# Patient Record
Sex: FEMALE | Race: WHITE | NOT HISPANIC OR LATINO | Employment: FULL TIME | URBAN - METROPOLITAN AREA
[De-identification: names, ages, dates, MRNs, and addresses within clinical notes are randomized per-mention and may not be internally consistent; named-entity substitution may affect disease eponyms.]

---

## 2020-04-28 DIAGNOSIS — R10.13 EPIGASTRIC PAIN: ICD-10-CM

## 2020-04-28 DIAGNOSIS — K42.9 UMBILICAL HERNIA WITHOUT OBSTRUCTION OR GANGRENE: Primary | ICD-10-CM

## 2020-05-13 ENCOUNTER — HOSPITAL ENCOUNTER (OUTPATIENT)
Dept: RADIOLOGY | Facility: HOSPITAL | Age: 55
Discharge: HOME | End: 2020-05-13
Attending: FAMILY MEDICINE
Payer: MEDICAID

## 2020-05-13 DIAGNOSIS — K42.9 UMBILICAL HERNIA WITHOUT OBSTRUCTION OR GANGRENE: ICD-10-CM

## 2020-05-13 DIAGNOSIS — R10.13 EPIGASTRIC PAIN: ICD-10-CM

## 2020-05-13 PROCEDURE — 76705 ECHO EXAM OF ABDOMEN: CPT | Mod: TC,PO

## 2020-05-13 PROCEDURE — 74018 RADEX ABDOMEN 1 VIEW: CPT | Mod: TC,PO

## 2020-05-20 DIAGNOSIS — M62.08 SEPARATION OF MUSCLE (NONTRAUMATIC), OTHER SITE: Primary | ICD-10-CM

## 2020-05-21 DIAGNOSIS — K43.9 VENTRAL HERNIA WITHOUT OBSTRUCTION OR GANGRENE: Primary | ICD-10-CM

## 2020-05-21 DIAGNOSIS — M62.08 SEPARATION OF MUSCLE (NONTRAUMATIC), OTHER SITE: ICD-10-CM

## 2024-09-24 ENCOUNTER — HOSPITAL ENCOUNTER (INPATIENT)
Facility: HOSPITAL | Age: 59
LOS: 1 days | Discharge: HOME | DRG: 313 | End: 2024-09-25
Attending: EMERGENCY MEDICINE | Admitting: STUDENT IN AN ORGANIZED HEALTH CARE EDUCATION/TRAINING PROGRAM
Payer: MEDICAID

## 2024-09-24 DIAGNOSIS — R07.9 CHEST PAIN: Primary | ICD-10-CM

## 2024-09-24 DIAGNOSIS — R07.1 CHEST PAIN ON BREATHING: ICD-10-CM

## 2024-09-24 LAB
ALBUMIN SERPL BCP-MCNC: 3.6 G/DL (ref 3.5–5.2)
ALP SERPL-CCNC: 112 U/L (ref 55–135)
ALT SERPL W/O P-5'-P-CCNC: 9 U/L (ref 10–44)
ANION GAP SERPL CALC-SCNC: 11 MMOL/L (ref 8–16)
AST SERPL-CCNC: 11 U/L (ref 10–40)
BASOPHILS # BLD AUTO: 0.04 K/UL (ref 0–0.2)
BASOPHILS NFR BLD: 0.3 % (ref 0–1.9)
BILIRUB SERPL-MCNC: 0.3 MG/DL (ref 0.1–1)
BNP SERPL-MCNC: 64 PG/ML (ref 0–99)
BUN SERPL-MCNC: 11 MG/DL (ref 6–20)
CALCIUM SERPL-MCNC: 9.3 MG/DL (ref 8.7–10.5)
CHLORIDE SERPL-SCNC: 104 MMOL/L (ref 95–110)
CO2 SERPL-SCNC: 26 MMOL/L (ref 23–29)
CREAT SERPL-MCNC: 0.9 MG/DL (ref 0.5–1.4)
DIFFERENTIAL METHOD BLD: ABNORMAL
EOSINOPHIL # BLD AUTO: 0.3 K/UL (ref 0–0.5)
EOSINOPHIL NFR BLD: 2.1 % (ref 0–8)
ERYTHROCYTE [DISTWIDTH] IN BLOOD BY AUTOMATED COUNT: 13.7 % (ref 11.5–14.5)
EST. GFR  (NO RACE VARIABLE): >60 ML/MIN/1.73 M^2
GLUCOSE SERPL-MCNC: 130 MG/DL (ref 70–110)
HCT VFR BLD AUTO: 44.1 % (ref 37–48.5)
HGB BLD-MCNC: 14.2 G/DL (ref 12–16)
IMM GRANULOCYTES # BLD AUTO: 0.03 K/UL (ref 0–0.04)
IMM GRANULOCYTES NFR BLD AUTO: 0.2 % (ref 0–0.5)
LYMPHOCYTES # BLD AUTO: 3.9 K/UL (ref 1–4.8)
LYMPHOCYTES NFR BLD: 30.9 % (ref 18–48)
MCH RBC QN AUTO: 28.3 PG (ref 27–31)
MCHC RBC AUTO-ENTMCNC: 32.2 G/DL (ref 32–36)
MCV RBC AUTO: 88 FL (ref 82–98)
MONOCYTES # BLD AUTO: 0.6 K/UL (ref 0.3–1)
MONOCYTES NFR BLD: 4.8 % (ref 4–15)
NEUTROPHILS # BLD AUTO: 7.8 K/UL (ref 1.8–7.7)
NEUTROPHILS NFR BLD: 61.7 % (ref 38–73)
NRBC BLD-RTO: 0 /100 WBC
PLATELET # BLD AUTO: 312 K/UL (ref 150–450)
PMV BLD AUTO: 9.8 FL (ref 9.2–12.9)
POTASSIUM SERPL-SCNC: 4 MMOL/L (ref 3.5–5.1)
PROT SERPL-MCNC: 7.7 G/DL (ref 6–8.4)
RBC # BLD AUTO: 5.02 M/UL (ref 4–5.4)
SODIUM SERPL-SCNC: 141 MMOL/L (ref 136–145)
TROPONIN I SERPL DL<=0.01 NG/ML-MCNC: 0.01 NG/ML (ref 0–0.03)
TROPONIN I SERPL DL<=0.01 NG/ML-MCNC: 0.01 NG/ML (ref 0–0.03)
WBC # BLD AUTO: 12.61 K/UL (ref 3.9–12.7)

## 2024-09-24 PROCEDURE — 21400001 HC TELEMETRY ROOM

## 2024-09-24 PROCEDURE — 36415 COLL VENOUS BLD VENIPUNCTURE: CPT | Performed by: NURSE PRACTITIONER

## 2024-09-24 PROCEDURE — 94761 N-INVAS EAR/PLS OXIMETRY MLT: CPT

## 2024-09-24 PROCEDURE — 99285 EMERGENCY DEPT VISIT HI MDM: CPT | Mod: 25

## 2024-09-24 PROCEDURE — 94760 N-INVAS EAR/PLS OXIMETRY 1: CPT

## 2024-09-24 PROCEDURE — 93005 ELECTROCARDIOGRAM TRACING: CPT

## 2024-09-24 PROCEDURE — 99900031 HC PATIENT EDUCATION (STAT)

## 2024-09-24 PROCEDURE — 83880 ASSAY OF NATRIURETIC PEPTIDE: CPT | Performed by: NURSE PRACTITIONER

## 2024-09-24 PROCEDURE — 80053 COMPREHEN METABOLIC PANEL: CPT | Performed by: NURSE PRACTITIONER

## 2024-09-24 PROCEDURE — 85025 COMPLETE CBC W/AUTO DIFF WBC: CPT | Performed by: NURSE PRACTITIONER

## 2024-09-24 PROCEDURE — 93010 ELECTROCARDIOGRAM REPORT: CPT | Mod: ,,, | Performed by: GENERAL PRACTICE

## 2024-09-24 PROCEDURE — 84484 ASSAY OF TROPONIN QUANT: CPT | Performed by: NURSE PRACTITIONER

## 2024-09-24 NOTE — FIRST PROVIDER EVALUATION
Emergency Department TeleTriage Encounter Note      CHIEF COMPLAINT    Chief Complaint   Patient presents with    Chest Pain     Heart attack 9/22       VITAL SIGNS   Initial Vitals [09/24/24 1103]   BP Pulse Resp Temp SpO2   138/80 105 18 97.3 °F (36.3 °C) 96 %      MAP       --            ALLERGIES    Review of patient's allergies indicates:  No Known Allergies    PROVIDER TRIAGE NOTE  TeleTriage Note: Ceci Fitzpatrick, a nontoxic/well appearing, 59 y.o. female, presented to the ED with c/o chest pain that began on 9/15. Seen at [facility] on 9/16 for NSTEMI but signed out AMA. Now coming back because she is still having intermittent chest pains.     All ED beds are full at present; patient notified of this status.  Patient seen and medically screened by Nurse Practitioner via teletriage. Orders initiated at triage to expedite care.  Patient is stable to return to the waiting room and will be placed in an ED bed when available.  Care will be transferred to an alternate provider when patient has been placed in an Exam Room from the [facility] for physical exam, additional orders, and disposition.  11:06 AM Latonia Mendoza, DNP, FNP-C        ORDERS  Labs Reviewed   CBC W/ AUTO DIFFERENTIAL   COMPREHENSIVE METABOLIC PANEL   TROPONIN I   TROPONIN I   B-TYPE NATRIURETIC PEPTIDE       ED Orders (720h ago, onward)      Start Ordered     Status Ordering Provider    09/24/24 1410 09/24/24 1109  Troponin I #2  Once Timed         Pending Collection LATONIA MENDOZA    09/24/24 1110 09/24/24 1109  Vital signs  Every 15 min         Ordered LATONIA MENDOZA    09/24/24 1110 09/24/24 1109  Cardiac Monitoring - Adult  Continuous        Comments: Notify Physician If:    Ordered LATONIA MENDOZA    09/24/24 1110 09/24/24 1109  Pulse Oximetry Continuous  Continuous         Ordered LATONIA MENDOZA    09/24/24 1110 09/24/24 1109  Diet NPO  Diet effective now         Ordered LATONIA MENDOZA    09/24/24 1110 09/24/24 1109  Saline lock IV   Once         Ordered REJITAMIA QUIGLEYHany    09/24/24 1110 09/24/24 1109  EKG 12-lead  Once        Comments: Do not perform if previously done during this visit/ triage    Ordered REJI, TAMIA BEDOLLAHany    09/24/24 1110 09/24/24 1109  CBC auto differential  STAT         Ordered REJI, TAIMA BEDOLLAHany    09/24/24 1110 09/24/24 1109  Comprehensive metabolic panel  STAT         Ordered REJI, TAMIA BEDOLLAHany    09/24/24 1110 09/24/24 1109  Troponin I #1  STAT         Ordered REJI, TAMIA BEDOLLAHany    09/24/24 1110 09/24/24 1109  B-Type natriuretic peptide (BNP)  STAT         Ordered REJI, TAMIA BEDOLLAHany              Virtual Visit Note: The provider triage portion of this emergency department evaluation and documentation was performed via [employer], a HIPAA-compliant telemedicine application, in concert with a tele-presenter in the room. A face to face patient evaluation with one of my colleagues will occur once the patient is placed in an emergency department room.      DISCLAIMER: This note was prepared with [employer] voice recognition transcription software. Garbled syntax, mangled pronouns, and other bizarre constructions may be attributed to that software system.

## 2024-09-24 NOTE — ED PROVIDER NOTES
Encounter Date: 9/24/2024       History     Chief Complaint   Patient presents with    Chest Pain     Heart attack 9/22     HPI patient is a 59-year-old woman who presents emergency department evaluation of chest discomfort.  Patient reports that she was admitted at [facility] last week after an episode of chest pain that was pressure-like with diaphoresis and associated nausea and vomiting lasting approximately 5-7 minutes and diagnosed with an NSTEMI and treated with heparin drip.  She left against medical advice prior to cardiac catheterization the next morning.  She lives up here and wants to set up cardiology evaluation here in [place].  Review of patient's allergies indicates:  No Known Allergies  No past medical history on file.  No past surgical history on file.  No family history on file.  Social History     Tobacco Use    Smoking status: Every Day     Current packs/day: 1.00     Types: Cigarettes   Substance Use Topics    Alcohol use: Yes     Comment: occas     Review of Systems   Constitutional:  Negative for fever.   HENT:  Negative for sore throat.    Respiratory:  Negative for shortness of breath.    Cardiovascular:  Positive for chest pain.   Gastrointestinal:  Negative for nausea.   Genitourinary:  Negative for dysuria.   Musculoskeletal:  Negative for back pain.   Skin:  Negative for rash.   Neurological:  Negative for weakness.   Hematological:  Does not bruise/bleed easily.   Psychiatric/Behavioral:  The patient is nervous/anxious.        Physical Exam     Initial Vitals [09/24/24 1103]   BP Pulse Resp Temp SpO2   138/80 105 18 97.3 °F (36.3 °C) 96 %      MAP       --         Physical Exam    Constitutional: Vital signs are normal. She appears well-developed and well-nourished.  Non-toxic appearance. No distress.   HENT:   Head: Normocephalic and atraumatic.   Eyes: EOM are normal. Pupils are equal, round, and reactive to light.   Neck: Neck supple. No JVD present.   Normal  range of motion.  Cardiovascular:  Normal rate, regular rhythm, normal heart sounds and intact distal pulses.     Exam reveals no gallop and no friction rub.       No murmur heard.  Pulmonary/Chest: Breath sounds normal. She has no wheezes. She has no rhonchi. She has no rales.   Abdominal: Abdomen is soft. Bowel sounds are normal. There is no abdominal tenderness. There is no rebound and no guarding.   Musculoskeletal:         General: Normal range of motion.      Cervical back: Normal range of motion and neck supple. No rigidity.     Neurological: She is alert and oriented to person, place, and time. She has normal strength and normal reflexes. No cranial nerve deficit or sensory deficit. She exhibits normal muscle tone. Coordination normal. GCS eye subscore is 4. GCS verbal subscore is 5. GCS motor subscore is 6.   Skin: Skin is warm and dry.   Psychiatric: She has a normal mood and affect. Her speech is normal and behavior is normal. She is not actively hallucinating.         ED Course   Procedures  Labs Reviewed   CBC W/ AUTO DIFFERENTIAL - Abnormal       Result Value    WBC 12.61      RBC 5.02      Hemoglobin 14.2      Hematocrit 44.1      MCV 88      MCH 28.3      MCHC 32.2      RDW 13.7      Platelets 312      MPV 9.8      Immature Granulocytes 0.2      Gran # (ANC) 7.8 (*)     Immature Grans (Abs) 0.03      Lymph # 3.9      Mono # 0.6      Eos # 0.3      Baso # 0.04      nRBC 0      Gran % 61.7      Lymph % 30.9      Mono % 4.8      Eosinophil % 2.1      Basophil % 0.3      Differential Method Automated     COMPREHENSIVE METABOLIC PANEL - Abnormal    Sodium 141      Potassium 4.0      Chloride 104      CO2 26      Glucose 130 (*)     BUN 11      Creatinine 0.9      Calcium 9.3      Total Protein 7.7      Albumin 3.6      Total Bilirubin 0.3      Alkaline Phosphatase 112      AST 11      ALT 9 (*)     eGFR >60      Anion Gap 11     TROPONIN I    Troponin I 0.010     TROPONIN I    Troponin I 0.010     B-TYPE  NATRIURETIC PEPTIDE    BNP 64            Imaging Results              X-Ray Chest AP Portable (Final result)  Result time 09/24/24 17:38:49      Final result by Felix Hernandez Jr., MD (09/24/24 17:38:49)                   Impression:      No acute abnormality.      Electronically signed by: Felix Hernandez MD  Date:    09/24/2024  Time:    17:38               Narrative:    EXAMINATION:  XR CHEST AP PORTABLE    CLINICAL HISTORY:  Chest pain, unspecified    TECHNIQUE:  Single frontal view of the chest was performed.    COMPARISON:  None    FINDINGS:  The lungs are clear, with normal appearance of pulmonary vasculature and no pleural effusion or pneumothorax.    The cardiac silhouette is normal in size. The hilar and mediastinal contours are unremarkable.  Bilateral pericardial fat pads are noted.    Bones are intact.                                       Medications - No data to display  Medical Decision Making  59-year-old woman who presents emergency department evaluation of chest discomfort.  Patient reports that she was admitted at [facility] last week after an episode of chest pain that was pressure-like with diaphoresis and associated nausea and vomiting lasting approximately 5-7 minutes and diagnosed with an NSTEMI and treated with heparin drip.  She left against medical advice prior to cardiac catheterization the next morning.  She lives up here and wants to set up cardiology evaluation here in [place].  EKG shows prior infarct, no acute ischemic changes.  BNP and troponin are normal here in the emergency department.  He is not tachycardic or hypoxic and no pleuritic pain, I doubt pulmonary embolism.  She has high-risk for recurrent ACS and has not been on antiplatelet therapy.  Discussed with cardiology who recommends sending to [facility] for evaluation for angiogram.    Amount and/or Complexity of Data Reviewed  Radiology: ordered.    Risk  Decision regarding  hospitalization.               ED Course as of 09/24/24 2137   Tue Sep 24, 2024   1724 Discussed with Dr. Espinosa who recommends transferring patient to [facility] for admission and cardiology evaluation for cardiac angiogram [AS]      ED Course User Index  [AS] Stu Piedra MD                           Clinical Impression:  Final diagnoses:  [R07.9] Chest pain          ED Disposition Condition    Admit                 Stu Piedra MD  09/24/24 2137

## 2024-09-25 ENCOUNTER — CLINICAL SUPPORT (OUTPATIENT)
Dept: CARDIOLOGY | Facility: HOSPITAL | Age: 59
End: 2024-09-25
Attending: EMERGENCY MEDICINE
Payer: MEDICAID

## 2024-09-25 ENCOUNTER — TELEPHONE (OUTPATIENT)
Dept: CARDIOLOGY | Facility: CLINIC | Age: 59
End: 2024-09-25

## 2024-09-25 ENCOUNTER — CLINICAL SUPPORT (OUTPATIENT)
Dept: CARDIOLOGY | Facility: HOSPITAL | Age: 59
End: 2024-09-25
Attending: STUDENT IN AN ORGANIZED HEALTH CARE EDUCATION/TRAINING PROGRAM
Payer: MEDICAID

## 2024-09-25 VITALS — BODY MASS INDEX: 31.7 KG/M2 | HEIGHT: 67 IN | WEIGHT: 201.94 LBS

## 2024-09-25 VITALS
BODY MASS INDEX: 31.71 KG/M2 | HEIGHT: 67 IN | SYSTOLIC BLOOD PRESSURE: 151 MMHG | DIASTOLIC BLOOD PRESSURE: 81 MMHG | OXYGEN SATURATION: 96 % | TEMPERATURE: 98 F | HEART RATE: 61 BPM | RESPIRATION RATE: 16 BRPM | WEIGHT: 202 LBS

## 2024-09-25 PROBLEM — R10.9 LEFT FLANK PAIN: Status: ACTIVE | Noted: 2024-09-25

## 2024-09-25 PROBLEM — R07.9 CHEST PAIN: Status: ACTIVE | Noted: 2024-09-25

## 2024-09-25 PROBLEM — F17.200 CURRENT EVERY DAY SMOKER: Status: ACTIVE | Noted: 2024-09-25

## 2024-09-25 LAB
ALBUMIN SERPL BCP-MCNC: 3.6 G/DL (ref 3.5–5.2)
ALP SERPL-CCNC: 86 U/L (ref 55–135)
ALT SERPL W/O P-5'-P-CCNC: 6 U/L (ref 10–44)
ANION GAP SERPL CALC-SCNC: 6 MMOL/L (ref 8–16)
AORTIC ROOT ANNULUS: 2.3 CM
AORTIC VALVE CUSP SEPERATION: 1.2 CM
APICAL FOUR CHAMBER EJECTION FRACTION: 52 %
AST SERPL-CCNC: 9 U/L (ref 10–40)
AV INDEX (PROSTH): 0.71
AV MEAN GRADIENT: 3 MMHG
AV PEAK GRADIENT: 6 MMHG
AV VALVE AREA BY VELOCITY RATIO: 2.25 CM²
AV VALVE AREA: 2.02 CM²
AV VELOCITY RATIO: 0.79
BASOPHILS # BLD AUTO: 0.04 K/UL (ref 0–0.2)
BASOPHILS NFR BLD: 0.3 % (ref 0–1.9)
BILIRUB SERPL-MCNC: 0.4 MG/DL (ref 0.1–1)
BILIRUB UR QL STRIP: NEGATIVE
BSA FOR ECHO PROCEDURE: 2.08 M2
BUN SERPL-MCNC: 9 MG/DL (ref 6–20)
CALCIUM SERPL-MCNC: 8.7 MG/DL (ref 8.7–10.5)
CHLORIDE SERPL-SCNC: 107 MMOL/L (ref 95–110)
CHOLEST SERPL-MCNC: 150 MG/DL (ref 120–199)
CHOLEST/HDLC SERPL: 3.8 {RATIO} (ref 2–5)
CLARITY UR: ABNORMAL
CO2 SERPL-SCNC: 27 MMOL/L (ref 23–29)
COLOR UR: YELLOW
CREAT SERPL-MCNC: 0.7 MG/DL (ref 0.5–1.4)
CV ECHO LV RWT: 0.39 CM
CV PHARM DOSE: 0.4 MG
CV STRESS BASE HR: 75 BPM
DIASTOLIC BLOOD PRESSURE: 65 MMHG
DIFFERENTIAL METHOD BLD: ABNORMAL
DOP CALC AO PEAK VEL: 1.26 M/S
DOP CALC AO VTI: 23.9 CM
DOP CALC LVOT AREA: 2.8 CM2
DOP CALC LVOT DIAMETER: 1.9 CM
DOP CALC LVOT PEAK VEL: 1 M/S
DOP CALC LVOT STROKE VOLUME: 48.18 CM3
DOP CALC MV VTI: 25.7 CM
DOP CALCLVOT PEAK VEL VTI: 17 CM
E WAVE DECELERATION TIME: 157 MSEC
E/A RATIO: 0.76
E/E' RATIO: 7.48 M/S
ECHO LV POSTERIOR WALL: 0.9 CM (ref 0.6–1.1)
EOSINOPHIL # BLD AUTO: 0.3 K/UL (ref 0–0.5)
EOSINOPHIL NFR BLD: 2.7 % (ref 0–8)
ERYTHROCYTE [DISTWIDTH] IN BLOOD BY AUTOMATED COUNT: 13.8 % (ref 11.5–14.5)
EST. GFR  (NO RACE VARIABLE): >60 ML/MIN/1.73 M^2
ESTIMATED AVG GLUCOSE: 128 MG/DL (ref 68–131)
FRACTIONAL SHORTENING: 35 % (ref 28–44)
GLUCOSE SERPL-MCNC: 126 MG/DL (ref 70–110)
GLUCOSE UR QL STRIP: NEGATIVE
HBA1C MFR BLD: 6.1 % (ref 4.5–6.2)
HCT VFR BLD AUTO: 40.3 % (ref 37–48.5)
HDLC SERPL-MCNC: 39 MG/DL (ref 40–75)
HDLC SERPL: 26 % (ref 20–50)
HGB BLD-MCNC: 13.1 G/DL (ref 12–16)
HGB UR QL STRIP: NEGATIVE
IMM GRANULOCYTES # BLD AUTO: 0.04 K/UL (ref 0–0.04)
IMM GRANULOCYTES NFR BLD AUTO: 0.3 % (ref 0–0.5)
INTERVENTRICULAR SEPTUM: 0.9 CM (ref 0.6–1.1)
IVC DIAMETER: 1.2 CM
KETONES UR QL STRIP: NEGATIVE
LDLC SERPL CALC-MCNC: 86.2 MG/DL (ref 63–159)
LEFT ATRIUM AREA SYSTOLIC (APICAL 2 CHAMBER): 17.3 CM2
LEFT ATRIUM AREA SYSTOLIC (APICAL 4 CHAMBER): 11.7 CM2
LEFT ATRIUM VOLUME INDEX MOD: 17.5 ML/M2
LEFT ATRIUM VOLUME MOD: 35.5 ML
LEFT INTERNAL DIMENSION IN SYSTOLE: 3 CM (ref 2.1–4)
LEFT VENTRICLE DIASTOLIC VOLUME INDEX: 47.95 ML/M2
LEFT VENTRICLE DIASTOLIC VOLUME: 97.34 ML
LEFT VENTRICLE END DIASTOLIC VOLUME APICAL 4 CHAMBER: 72 ML
LEFT VENTRICLE END SYSTOLIC VOLUME APICAL 2 CHAMBER: 47.6 ML
LEFT VENTRICLE END SYSTOLIC VOLUME APICAL 4 CHAMBER: 24.3 ML
LEFT VENTRICLE MASS INDEX: 68 G/M2
LEFT VENTRICLE SYSTOLIC VOLUME INDEX: 17.2 ML/M2
LEFT VENTRICLE SYSTOLIC VOLUME: 35 ML
LEFT VENTRICULAR INTERNAL DIMENSION IN DIASTOLE: 4.6 CM (ref 3.5–6)
LEFT VENTRICULAR MASS: 137.72 G
LEUKOCYTE ESTERASE UR QL STRIP: NEGATIVE
LV LATERAL E/E' RATIO: 6.14 M/S
LV SEPTAL E/E' RATIO: 9.56 M/S
LVED V (TEICH): 97.34 ML
LVES V (TEICH): 35 ML
LVOT MG: 2 MMHG
LVOT MV: 0.64 CM/S
LYMPHOCYTES # BLD AUTO: 4.4 K/UL (ref 1–4.8)
LYMPHOCYTES NFR BLD: 34 % (ref 18–48)
MAGNESIUM SERPL-MCNC: 1.8 MG/DL (ref 1.6–2.6)
MCH RBC QN AUTO: 28.1 PG (ref 27–31)
MCHC RBC AUTO-ENTMCNC: 32.5 G/DL (ref 32–36)
MCV RBC AUTO: 87 FL (ref 82–98)
MONOCYTES # BLD AUTO: 0.8 K/UL (ref 0.3–1)
MONOCYTES NFR BLD: 6 % (ref 4–15)
MV MEAN GRADIENT: 3 MMHG
MV PEAK A VEL: 1.13 M/S
MV PEAK E VEL: 0.86 M/S
MV PEAK GRADIENT: 5 MMHG
MV STENOSIS PRESSURE HALF TIME: 66 MS
MV VALVE AREA BY CONTINUITY EQUATION: 1.87 CM2
MV VALVE AREA P 1/2 METHOD: 3.33 CM2
NEUTROPHILS # BLD AUTO: 7.3 K/UL (ref 1.8–7.7)
NEUTROPHILS NFR BLD: 56.7 % (ref 38–73)
NITRITE UR QL STRIP: NEGATIVE
NONHDLC SERPL-MCNC: 111 MG/DL
NRBC BLD-RTO: 0 /100 WBC
OHS CV CPX 1 MINUTE RECOVERY HEART RATE: 114 BPM
OHS CV CPX 85 PERCENT MAX PREDICTED HEART RATE MALE: 139
OHS CV CPX MAX PREDICTED HEART RATE: 163
OHS CV CPX PATIENT IS FEMALE: 1
OHS CV CPX PATIENT IS MALE: 0
OHS CV CPX PEAK DIASTOLIC BLOOD PRESSURE: 53 MMHG
OHS CV CPX PEAK HEAR RATE: 114 BPM
OHS CV CPX PEAK RATE PRESSURE PRODUCT: NORMAL
OHS CV CPX PEAK SYSTOLIC BLOOD PRESSURE: 120 MMHG
OHS CV CPX PERCENT MAX PREDICTED HEART RATE ACHIEVED: 73
OHS CV CPX RATE PRESSURE PRODUCT PRESENTING: 8775
PH UR STRIP: 6 [PH] (ref 5–8)
PLATELET # BLD AUTO: 266 K/UL (ref 150–450)
PMV BLD AUTO: 9.8 FL (ref 9.2–12.9)
POTASSIUM SERPL-SCNC: 3.9 MMOL/L (ref 3.5–5.1)
PROT SERPL-MCNC: 6.5 G/DL (ref 6–8.4)
PROT UR QL STRIP: ABNORMAL
PV MV: 0.66 M/S
PV PEAK GRADIENT: 4 MMHG
PV PEAK VELOCITY: 0.98 M/S
RA PRESSURE ESTIMATED: 3 MMHG
RBC # BLD AUTO: 4.66 M/UL (ref 4–5.4)
RIGHT ATRIUM VOLUME AREA LENGTH APICAL 4 CHAMBER: 21.9 ML
RV TISSUE DOPPLER FREE WALL SYSTOLIC VELOCITY 1 (APICAL 4 CHAMBER VIEW): 10.7 CM/S
SODIUM SERPL-SCNC: 140 MMOL/L (ref 136–145)
SP GR UR STRIP: 1.03 (ref 1–1.03)
SYSTOLIC BLOOD PRESSURE: 117 MMHG
T4 FREE SERPL-MCNC: 0.8 NG/DL (ref 0.71–1.51)
TDI LATERAL: 0.14 M/S
TDI SEPTAL: 0.09 M/S
TDI: 0.12 M/S
TRICUSPID ANNULAR PLANE SYSTOLIC EXCURSION: 2.04 CM
TRIGL SERPL-MCNC: 124 MG/DL (ref 30–150)
TROPONIN I SERPL HS-MCNC: 5.5 PG/ML (ref 0–14.9)
TROPONIN I SERPL HS-MCNC: 6.7 PG/ML (ref 0–14.9)
TSH SERPL DL<=0.005 MIU/L-ACNC: 2.25 UIU/ML (ref 0.34–5.6)
URN SPEC COLLECT METH UR: ABNORMAL
UROBILINOGEN UR STRIP-ACNC: NEGATIVE EU/DL
WBC # BLD AUTO: 12.81 K/UL (ref 3.9–12.7)
Z-SCORE OF LEFT VENTRICULAR DIMENSION IN END DIASTOLE: -2.68
Z-SCORE OF LEFT VENTRICULAR DIMENSION IN END SYSTOLE: -1.63

## 2024-09-25 PROCEDURE — 99499 UNLISTED E&M SERVICE: CPT | Mod: 25,,, | Performed by: INTERNAL MEDICINE

## 2024-09-25 PROCEDURE — 94799 UNLISTED PULMONARY SVC/PX: CPT

## 2024-09-25 PROCEDURE — 93017 CV STRESS TEST TRACING ONLY: CPT

## 2024-09-25 PROCEDURE — 83735 ASSAY OF MAGNESIUM: CPT | Performed by: STUDENT IN AN ORGANIZED HEALTH CARE EDUCATION/TRAINING PROGRAM

## 2024-09-25 PROCEDURE — 80053 COMPREHEN METABOLIC PANEL: CPT | Performed by: STUDENT IN AN ORGANIZED HEALTH CARE EDUCATION/TRAINING PROGRAM

## 2024-09-25 PROCEDURE — 85025 COMPLETE CBC W/AUTO DIFF WBC: CPT | Performed by: STUDENT IN AN ORGANIZED HEALTH CARE EDUCATION/TRAINING PROGRAM

## 2024-09-25 PROCEDURE — 36415 COLL VENOUS BLD VENIPUNCTURE: CPT | Performed by: STUDENT IN AN ORGANIZED HEALTH CARE EDUCATION/TRAINING PROGRAM

## 2024-09-25 PROCEDURE — 93306 TTE W/DOPPLER COMPLETE: CPT

## 2024-09-25 PROCEDURE — 25000003 PHARM REV CODE 250: Performed by: STUDENT IN AN ORGANIZED HEALTH CARE EDUCATION/TRAINING PROGRAM

## 2024-09-25 PROCEDURE — 84439 ASSAY OF FREE THYROXINE: CPT | Performed by: STUDENT IN AN ORGANIZED HEALTH CARE EDUCATION/TRAINING PROGRAM

## 2024-09-25 PROCEDURE — 63600175 PHARM REV CODE 636 W HCPCS: Performed by: STUDENT IN AN ORGANIZED HEALTH CARE EDUCATION/TRAINING PROGRAM

## 2024-09-25 PROCEDURE — 84484 ASSAY OF TROPONIN QUANT: CPT | Mod: 91 | Performed by: STUDENT IN AN ORGANIZED HEALTH CARE EDUCATION/TRAINING PROGRAM

## 2024-09-25 PROCEDURE — 21400001 HC TELEMETRY ROOM

## 2024-09-25 PROCEDURE — 83036 HEMOGLOBIN GLYCOSYLATED A1C: CPT | Performed by: STUDENT IN AN ORGANIZED HEALTH CARE EDUCATION/TRAINING PROGRAM

## 2024-09-25 PROCEDURE — 93016 CV STRESS TEST SUPVJ ONLY: CPT | Mod: ,,, | Performed by: INTERNAL MEDICINE

## 2024-09-25 PROCEDURE — 84443 ASSAY THYROID STIM HORMONE: CPT | Performed by: STUDENT IN AN ORGANIZED HEALTH CARE EDUCATION/TRAINING PROGRAM

## 2024-09-25 PROCEDURE — 80061 LIPID PANEL: CPT | Performed by: STUDENT IN AN ORGANIZED HEALTH CARE EDUCATION/TRAINING PROGRAM

## 2024-09-25 PROCEDURE — 93018 CV STRESS TEST I&R ONLY: CPT | Mod: ,,, | Performed by: INTERNAL MEDICINE

## 2024-09-25 PROCEDURE — 94761 N-INVAS EAR/PLS OXIMETRY MLT: CPT

## 2024-09-25 PROCEDURE — 99900035 HC TECH TIME PER 15 MIN (STAT)

## 2024-09-25 PROCEDURE — 93306 TTE W/DOPPLER COMPLETE: CPT | Mod: 26,,, | Performed by: INTERNAL MEDICINE

## 2024-09-25 PROCEDURE — 81003 URINALYSIS AUTO W/O SCOPE: CPT | Performed by: NURSE PRACTITIONER

## 2024-09-25 RX ORDER — SODIUM,POTASSIUM PHOSPHATES 280-250MG
2 POWDER IN PACKET (EA) ORAL
Status: DISCONTINUED | OUTPATIENT
Start: 2024-09-25 | End: 2024-09-26 | Stop reason: HOSPADM

## 2024-09-25 RX ORDER — POLYETHYLENE GLYCOL 3350 17 G/17G
17 POWDER, FOR SOLUTION ORAL DAILY PRN
Status: DISCONTINUED | OUTPATIENT
Start: 2024-09-25 | End: 2024-09-26 | Stop reason: HOSPADM

## 2024-09-25 RX ORDER — ASPIRIN 81 MG/1
81 TABLET ORAL DAILY
Qty: 30 TABLET | Refills: 0 | Status: SHIPPED | OUTPATIENT
Start: 2024-09-26 | End: 2024-10-26

## 2024-09-25 RX ORDER — GUAIFENESIN 100 MG/5ML
200 SOLUTION ORAL EVERY 4 HOURS PRN
Status: DISCONTINUED | OUTPATIENT
Start: 2024-09-25 | End: 2024-09-26 | Stop reason: HOSPADM

## 2024-09-25 RX ORDER — NAPROXEN SODIUM 220 MG/1
81 TABLET, FILM COATED ORAL DAILY
Status: DISCONTINUED | OUTPATIENT
Start: 2024-09-25 | End: 2024-09-25

## 2024-09-25 RX ORDER — ONDANSETRON HYDROCHLORIDE 2 MG/ML
4 INJECTION, SOLUTION INTRAVENOUS EVERY 6 HOURS PRN
Status: DISCONTINUED | OUTPATIENT
Start: 2024-09-25 | End: 2024-09-26 | Stop reason: HOSPADM

## 2024-09-25 RX ORDER — NITROGLYCERIN 0.4 MG/1
0.4 TABLET SUBLINGUAL EVERY 5 MIN PRN
Status: DISCONTINUED | OUTPATIENT
Start: 2024-09-25 | End: 2024-09-26 | Stop reason: HOSPADM

## 2024-09-25 RX ORDER — IBUPROFEN 200 MG
16 TABLET ORAL
Status: DISCONTINUED | OUTPATIENT
Start: 2024-09-25 | End: 2024-09-26 | Stop reason: HOSPADM

## 2024-09-25 RX ORDER — SODIUM CHLORIDE 0.9 % (FLUSH) 0.9 %
10 SYRINGE (ML) INJECTION
Status: DISCONTINUED | OUTPATIENT
Start: 2024-09-25 | End: 2024-09-26 | Stop reason: HOSPADM

## 2024-09-25 RX ORDER — LANOLIN ALCOHOL/MO/W.PET/CERES
800 CREAM (GRAM) TOPICAL
Status: DISCONTINUED | OUTPATIENT
Start: 2024-09-25 | End: 2024-09-26 | Stop reason: HOSPADM

## 2024-09-25 RX ORDER — CLOPIDOGREL BISULFATE 75 MG/1
75 TABLET ORAL DAILY
Status: DISCONTINUED | OUTPATIENT
Start: 2024-09-26 | End: 2024-09-26 | Stop reason: HOSPADM

## 2024-09-25 RX ORDER — METOPROLOL TARTRATE 25 MG/1
12.5 TABLET, FILM COATED ORAL 2 TIMES DAILY
Qty: 30 TABLET | Refills: 0 | Status: SHIPPED | OUTPATIENT
Start: 2024-09-25 | End: 2024-10-25

## 2024-09-25 RX ORDER — ALUMINUM HYDROXIDE, MAGNESIUM HYDROXIDE, AND SIMETHICONE 1200; 120; 1200 MG/30ML; MG/30ML; MG/30ML
30 SUSPENSION ORAL 4 TIMES DAILY PRN
Status: DISCONTINUED | OUTPATIENT
Start: 2024-09-25 | End: 2024-09-26 | Stop reason: HOSPADM

## 2024-09-25 RX ORDER — ACETAMINOPHEN 325 MG/1
650 TABLET ORAL EVERY 4 HOURS PRN
Status: DISCONTINUED | OUTPATIENT
Start: 2024-09-25 | End: 2024-09-26 | Stop reason: HOSPADM

## 2024-09-25 RX ORDER — ENOXAPARIN SODIUM 100 MG/ML
40 INJECTION SUBCUTANEOUS EVERY 24 HOURS
Status: DISCONTINUED | OUTPATIENT
Start: 2024-09-25 | End: 2024-09-26 | Stop reason: HOSPADM

## 2024-09-25 RX ORDER — TALC
3 POWDER (GRAM) TOPICAL NIGHTLY PRN
Status: DISCONTINUED | OUTPATIENT
Start: 2024-09-25 | End: 2024-09-26 | Stop reason: HOSPADM

## 2024-09-25 RX ORDER — IBUPROFEN 200 MG
1 TABLET ORAL DAILY
Qty: 30 PATCH | Refills: 0 | Status: SHIPPED | OUTPATIENT
Start: 2024-09-25 | End: 2024-10-25

## 2024-09-25 RX ORDER — REGADENOSON 0.08 MG/ML
0.4 INJECTION, SOLUTION INTRAVENOUS
Status: COMPLETED | OUTPATIENT
Start: 2024-09-25 | End: 2024-09-25

## 2024-09-25 RX ORDER — NALOXONE HCL 0.4 MG/ML
0.02 VIAL (ML) INJECTION
Status: DISCONTINUED | OUTPATIENT
Start: 2024-09-25 | End: 2024-09-26 | Stop reason: HOSPADM

## 2024-09-25 RX ORDER — ATORVASTATIN CALCIUM 40 MG/1
40 TABLET, FILM COATED ORAL DAILY
Status: DISCONTINUED | OUTPATIENT
Start: 2024-09-25 | End: 2024-09-26 | Stop reason: HOSPADM

## 2024-09-25 RX ORDER — CLOPIDOGREL BISULFATE 75 MG/1
75 TABLET ORAL DAILY
Qty: 30 TABLET | Refills: 0 | Status: SHIPPED | OUTPATIENT
Start: 2024-09-26 | End: 2024-10-26

## 2024-09-25 RX ORDER — ATORVASTATIN CALCIUM 40 MG/1
40 TABLET, FILM COATED ORAL DAILY
Qty: 30 TABLET | Refills: 0 | Status: SHIPPED | OUTPATIENT
Start: 2024-09-25 | End: 2024-10-25

## 2024-09-25 RX ORDER — CEFUROXIME AXETIL 500 MG/1
500 TABLET ORAL EVERY 12 HOURS
Qty: 10 TABLET | Refills: 0 | Status: SHIPPED | OUTPATIENT
Start: 2024-09-25 | End: 2024-09-30

## 2024-09-25 RX ORDER — IBUPROFEN 200 MG
1 TABLET ORAL DAILY
Status: DISCONTINUED | OUTPATIENT
Start: 2024-09-25 | End: 2024-09-26 | Stop reason: HOSPADM

## 2024-09-25 RX ORDER — METOPROLOL TARTRATE 25 MG/1
12.5 TABLET ORAL 2 TIMES DAILY
Status: DISCONTINUED | OUTPATIENT
Start: 2024-09-25 | End: 2024-09-26 | Stop reason: HOSPADM

## 2024-09-25 RX ORDER — ASPIRIN 81 MG/1
81 TABLET ORAL DAILY
Status: DISCONTINUED | OUTPATIENT
Start: 2024-09-26 | End: 2024-09-26 | Stop reason: HOSPADM

## 2024-09-25 RX ORDER — IPRATROPIUM BROMIDE AND ALBUTEROL SULFATE 2.5; .5 MG/3ML; MG/3ML
3 SOLUTION RESPIRATORY (INHALATION) EVERY 6 HOURS PRN
Status: DISCONTINUED | OUTPATIENT
Start: 2024-09-25 | End: 2024-09-26 | Stop reason: HOSPADM

## 2024-09-25 RX ORDER — GLUCAGON 1 MG
1 KIT INJECTION
Status: DISCONTINUED | OUTPATIENT
Start: 2024-09-25 | End: 2024-09-26 | Stop reason: HOSPADM

## 2024-09-25 RX ORDER — IBUPROFEN 200 MG
24 TABLET ORAL
Status: DISCONTINUED | OUTPATIENT
Start: 2024-09-25 | End: 2024-09-26 | Stop reason: HOSPADM

## 2024-09-25 RX ADMIN — REGADENOSON 0.4 MG: 0.08 INJECTION, SOLUTION INTRAVENOUS at 12:09

## 2024-09-25 RX ADMIN — ASPIRIN 81 MG 81 MG: 81 TABLET ORAL at 08:09

## 2024-09-25 NOTE — CONSULTS
[facility]  Department of Cardiology  Consult Note      PATIENT NAME: Ceci Fitzpatrick  MRN: [id number]  TODAY'S DATE: 09/25/2024  ADMIT DATE: 9/24/2024                          CONSULT REQUESTED BY: Rashel Aguilar DO    SUBJECTIVE     PRINCIPAL PROBLEM: <principal problem not specified>      REASON FOR CONSULT:  Chest Pain  Recent NSTEMI      HPI:      Chest Pain        Heart attack 9/22         HPI: The patient is a 59-year-old female with past medical history of arthritis who presented to [facility] for the evaluation of chest discomfort.     The patient reports that she was admitted to [facility] on 09/16/2024 for NSTEMI.  The patient reports that she was having intermittent chest pain and was found to have elevated high sensitivity troponin.  The patient was arranged for angiogram however the patient signed out AMA before doing angiogram as the patient wanted to get her cardiac care in the area where she lives which is here.  She reports that she has been having intermittent chest pain for the last few days.  She reports that the chest pain was fleeting, without any radiation not associated with any physical activity. She denies any fever, chills, shortness of breath, bilateral swelling of lower extremities, orthopnea, PND, nausea, vomiting. She denies any prior history of any heart issues. She complains of pain over her left flank area.  She denies any urinary symptoms, diarrhea, constipation.  She reports that she smokes cigarettes and has been smoking for the last 40 years.      No past medical history on file.     No past surgical history on file.     Review of patient's allergies indicates:  No Known Allergies     No current facility-administered medications on file prior to encounter.      No current outpatient medications on file prior to encounter.             Interval    Came here with complaints of Chest Pain  Troponin negative   Underwent Stress Testing today.   Stress shows evidence of need for  angiographic assessment    Patient presented with all recommendations and decides to go home medical management.        Review of patient's allergies indicates:  No Known Allergies    No past medical history on file.  No past surgical history on file.  Social History     Tobacco Use    Smoking status: Every Day     Current packs/day: 1.00     Types: Cigarettes   Substance Use Topics    Alcohol use: Yes     Comment: occas        REVIEW OF SYSTEMS    As mentioned in HPI    OBJECTIVE     VITAL SIGNS (Most Recent)  Temp: 98.1 °F (36.7 °C) (09/25/24 0750)  Pulse: 61 (09/25/24 0820)  Resp: 16 (09/25/24 0820)  BP: (!) 151/81 (100) (09/25/24 0750)  SpO2: 96 % (09/25/24 0820)    VENTILATION STATUS  Resp: 16 (09/25/24 0820)  SpO2: 96 % (09/25/24 0820)           I & O (Last 24H):  Intake/Output Summary (Last 24 hours) at 9/25/2024 1442  Last data filed at 9/25/2024 0852  Gross per 24 hour   Intake 0 ml   Output --   Net 0 ml       WEIGHTS  Wt Readings from Last 3 Encounters:   09/24/24 2347 91.6 kg (202 lb)   09/24/24 1103 81.6 kg (180 lb)   09/25/24 0950 91.6 kg (201 lb 15.1 oz)   07/10/14 1641 97.5 kg (215 lb)       PHYSICAL EXAM    CONSTITUTIONAL: NAD  HEENT: Normocephalic. No pallor  NECK: no JVD  LUNGS: CTA b/l  HEART: regular rate and rhythm, S1, S2 normal, no murmur   ABDOMEN: soft, non-tender, bowel sounds normal  EXTREMITIES: No edema  SKIN: No rash  NEURO: AAO X 3  PSYCH: normal affect      HOME MEDICATIONS:  No current facility-administered medications on file prior to encounter.     No current outpatient medications on file prior to encounter.       SCHEDULED MEDS:   aspirin  81 mg Oral Daily    atorvastatin  40 mg Oral Daily    enoxparin  40 mg Subcutaneous Daily       CONTINUOUS INFUSIONS:    PRN MEDS:  Current Facility-Administered Medications:     acetaminophen, 650 mg, Oral, Q4H PRN    albuterol-ipratropium, 3 mL, Nebulization, Q6H PRN    aluminum-magnesium hydroxide-simethicone, 30 mL, Oral, QID PRN     "dextrose 50%, 12.5 g, Intravenous, PRN    dextrose 50%, 25 g, Intravenous, PRN    glucagon (human recombinant), 1 mg, Intramuscular, PRN    glucose, 16 g, Oral, PRN    glucose, 24 g, Oral, PRN    guaiFENesin 100 mg/5 ml, 200 mg, Oral, Q4H PRN    magnesium oxide, 800 mg, Oral, PRN    magnesium oxide, 800 mg, Oral, PRN    melatonin, 3 mg, Oral, Nightly PRN    naloxone, 0.02 mg, Intravenous, PRN    nitroGLYCERIN, 0.4 mg, Sublingual, Q5 Min PRN    ondansetron, 4 mg, Intravenous, Q6H PRN    polyethylene glycol, 17 g, Oral, Daily PRN    potassium bicarbonate, 35 mEq, Oral, PRN    potassium bicarbonate, 50 mEq, Oral, PRN    potassium bicarbonate, 60 mEq, Oral, PRN    potassium, sodium phosphates, 2 packet, Oral, PRN    potassium, sodium phosphates, 2 packet, Oral, PRN    potassium, sodium phosphates, 2 packet, Oral, PRN    sodium chloride 0.9%, 10 mL, Intravenous, PRN    LABS AND DIAGNOSTICS     CBC LAST 3 DAYS  Recent Labs   Lab 09/24/24  1219 09/25/24  0457   WBC 12.61 12.81*   RBC 5.02 4.66   HGB 14.2 13.1   HCT 44.1 40.3   MCV 88 87   MCH 28.3 28.1   MCHC 32.2 32.5   RDW 13.7 13.8    266   MPV 9.8 9.8   GRAN 61.7  7.8* 56.7  7.3   LYMPH 30.9  3.9 34.0  4.4   MONO 4.8  0.6 6.0  0.8   BASO 0.04 0.04   NRBC 0 0       COAGULATION LAST 3 DAYS  No results for input(s): "LABPT", "INR", "APTT" in the last 168 hours.    CHEMISTRY LAST 3 DAYS  Recent Labs   Lab 09/24/24  1219 09/25/24  0457    140   K 4.0 3.9    107   CO2 26 27   ANIONGAP 11 6*   BUN 11 9   CREATININE 0.9 0.7   * 126*   CALCIUM 9.3 8.7   MG  --  1.8   ALBUMIN 3.6 3.6   PROT 7.7 6.5   ALKPHOS 112 86   ALT 9* 6*   AST 11 9*   BILITOT 0.3 0.4       CARDIAC PROFILE LAST 3 DAYS  Recent Labs   Lab 09/24/24  1219 09/24/24  1343 09/25/24  0040 09/25/24  0457   BNP 64  --   --   --    TROPONINI 0.010 0.010  --   --    TROPONINIHS  --   --  6.7 5.5       ENDOCRINE LAST 3 DAYS  Recent Labs   Lab 09/25/24  0040   TSH 2.255       LAST ARTERIAL " "BLOOD GAS  ABG  No results for input(s): "PH", "PO2", "PCO2", "HCO3", "BE" in the last 168 hours.    LAST 7 DAYS MICROBIOLOGY   Microbiology Results (last 7 days)       ** No results found for the last 168 hours. **            MOST RECENT IMAGING  Nuclear Stress Test    The ECG portion of the study is negative for ischemia.    The patient reported no chest pain during the stress test.    There were no arrhythmias during stress.    The nuclear portion of this study will be reported separately.  NM Myocardial Perfusion Spect Multi Pharmacologic  Narrative: EXAMINATION:  NM MYOCARDIAL PERFUSION SPECT MULTI PHARM    CLINICAL HISTORY:  Chest pain/anginal equiv, high CAD risk, not treadmill candidate; Chest pain on breathing    TECHNIQUE:  Radiopharmaceutical: 11 mCi Technetium 99m Myoview utilized for rest imaging. 26.8 mCi Technetium 99m Myoview utilized for stress imaging.    0.4 mg Lexiscan administered for pharmacologic stress.    COMPARISON:  None    FINDINGS:  Tomographic images reveal a small fixed perfusion defect of the anterior medial left ventricle near the apex.  No reversible perfusion defect to suggest myocardial ischemia.    Gated SPECT images show normal left ventricular wall motion. Calculated left ventricular ejection fraction is 62 %.  Impression: 1. Small fixed perfusion defect of the anteromedial left ventricle near the apex could reflect myocardial scarring or attenuation artifact.    2. Normal left ventricular wall motion.    3. Calculated left ventricular ejection fraction of 62 %.    Electronically signed by: Abisai Duong  Date:    09/25/2024  Time:    13:34      ECHOCARDIOGRAM RESULTS (last 5)  No results found for this or any previous visit.      CURRENT/PREVIOUS VISIT EKG  No results found for this or any previous visit.        ASSESSMENT/PLAN:     There are no active hospital problems to display for this patient.      ASSESSMENT & PLAN:       Recent NSTEMI  Abnormal Stress Test  Tobacco " use  GERD  Left flank pain      RECOMMENDATIONS:    Patient presented with all options to include angiographic assessment vs medical management  Patient refuses angiogram at this time  She can go home on ASA, Plavix, Stent  Metoprolol 12.5 mg PO BID  Recommend smoking cessation  Follow up in cardiac clinic in 1-2  weeks    Janine Palomino NP  Department of Cardiology  Date of Service: 09/25/2024        I have personally interviewed and examined the patient, I have reviewed the Nurse Practitioner's history and physical, assessment, and plan. I agree with the findings and plan.    1. Patient has a known history of smoking abnormal troponin and had left the previous hospital against medical advice underwent stress myocardial perfusion study was found to have mostly fixed and partly reversible perfusion defect in the anterior apical portion of the left ventricle.  Discussed with patient at length and in detail in regards with the options including cardiac catheterization and coronary angiography versus cardiac CTA versus medical management.  2. Patient at the present time refuses cardiac catheterization and coronary angiography and wishes to be treated medically.  Will start her on Plavix 75 mg p.o. daily for 1 year because of the abnormal troponin levels.  And also baby aspirin 81 mg p.o. daily.  She understands the risk of bleeding.    3. Patient is very anxious to go home she wants leave as soon as possible.  Patient to follow-up with cardiology in 1-2 weeks time.  She does understand the risk of myocardial infarction cardiac arrhythmias and sudden cardiac death.  All questions have been answered to patient's satisfaction.      Raymundo Priest M.D.  Department of Cardiology  Date of Service: 09/25/2024  2:42 PM

## 2024-09-25 NOTE — CARE UPDATE
09/24/24 1944   Patient Assessment/Suction   Level of Consciousness (AVPU) alert   PRE-TX-O2   Device (Oxygen Therapy) room air   SpO2 96 %   Pulse Oximetry Type Continuous   Pulse 86

## 2024-09-25 NOTE — CARE UPDATE
09/25/24 0820   Patient Assessment/Suction   Level of Consciousness (AVPU) alert   All Lung Fields Breath Sounds diminished   PRE-TX-O2   Device (Oxygen Therapy) room air   SpO2 96 %   Pulse Oximetry Type Intermittent   $ Pulse Oximetry - Multiple Charge Pulse Oximetry - Multiple   Pulse 61   Resp 16

## 2024-09-25 NOTE — HPI
The patient is a 59-year-old female with past medical history of arthritis who presented to [facility] for the evaluation of chest discomfort.    The patient reports that she was admitted to [facility] on 09/16/2024 for NSTEMI.  The patient reports that she was having intermittent chest pain and was found to have elevated high sensitivity troponin.  The patient was arranged for angiogram however the patient signed out AMA before doing angiogram as the patient wanted to get her cardiac care in the area where she lives which is here.  She reports that she has been having intermittent chest pain for the last few days.  She reports that the chest pain was fleeting, without any radiation not associated with any physical activity. She denies any fever, chills, shortness of breath, bilateral swelling of lower extremities, orthopnea, PND, nausea, vomiting. She denies any prior history of any heart issues. She complains of pain over her left flank area.  She denies any urinary symptoms, diarrhea, constipation.  She reports that she smokes cigarettes and has been smoking for the last 40 years.

## 2024-09-25 NOTE — PLAN OF CARE
Pt clear for DC from case management standpoint. Discharging to home    Requested cards apt via in basket. Office to contact pt for scheduling       09/25/24 3322   Final Note   Assessment Type Final Discharge Note   Anticipated Discharge Disposition Home

## 2024-09-25 NOTE — TELEPHONE ENCOUNTER
----- Message from Ama Sampson RN sent at 9/25/2024  3:49 PM CDT -----  Regarding: hospital follow up  Good afternoon    This patient is discharging from [facility] today and needs a close follow up apt scheduled. Please contact pt for scheduling      Thank you  Ama

## 2024-09-25 NOTE — SUBJECTIVE & OBJECTIVE
No past medical history on file.    No past surgical history on file.    Review of patient's allergies indicates:  No Known Allergies    No current facility-administered medications on file prior to encounter.     No current outpatient medications on file prior to encounter.     Family History    None       Tobacco Use    Smoking status: Every Day     Current packs/day: 1.00     Types: Cigarettes    Smokeless tobacco: Not on file   Substance and Sexual Activity    Alcohol use: Yes     Comment: occas    Drug use: Not on file    Sexual activity: Yes     Birth control/protection: None     Review of Systems    Review of symptoms:    Constitutional:  Negative for fever, chills, generalized weakness, appetite change  HENT:  Negative for congestion, sore throat  Eyes:  Negative for redness, discharge  Respiratory:  Negative for cough and shortness of breath  Cardiovascular:  Positive for chest pain  Gastrointestinal:  Negative for abdominal pain, nausea, vomiting, diarrhea  Genitourinary:  Negative for flank pain, difficulty urination  Musculoskeletal:  Negative for arthralgia, myalgia  Skin:  Negative for color change  Neuro:  Negative for dizziness, focal weakness  Psychiatric:  Negative for agitation, confusion    Objective:     Vital Signs (Most Recent):  Temp: 98 °F (36.7 °C) (09/24/24 2241)  Pulse: 78 (09/24/24 2300)  Resp: 18 (09/24/24 2300)  BP: 115/74 (84) (09/24/24 2241)  SpO2: 96 % (09/24/24 2300) Vital Signs (24h Range):  Temp:  [97.3 °F (36.3 °C)-98 °F (36.7 °C)] 98 °F (36.7 °C)  Pulse:  [] 78  Resp:  [18] 18  SpO2:  [95 %-98 %] 96 %  BP: (105-138)/(51-81) 115/74     Weight: 91.6 kg (202 lb)  Body mass index is 31.64 kg/m².     Physical Exam      Physical examination:    General:  Awake, alert, not in apparent distress  Head:  NC/AT  HEENT:  PERRLA, EOMI, no pallor or icterus               Oral mucosa moist without exudates or erythema  Neck:  Supple, no JVD, no lymphadenopathy  Chest:  S1-S2 normal, no  M/G/R  Respiratory:  Normal vesicular breath sounds with no added sounds  Abdomen:  Soft, nondistended, nontender, no organomegaly, bowel sounds present  Extremities:  No pitting edema of bilateral lower extremities present  Neuro:  Awake, alert, oriented x3, grossly intact motor and sensory exam  Psychiatric:  Normal mood and affect     Significant Labs: All pertinent labs within the past 24 hours have been reviewed.  Recent Lab Results         09/24/24  1343   09/24/24  1219        Albumin   3.6       ALP   112       ALT   9       Anion Gap   11       AST   11       Baso #   0.04       Basophil %   0.3       BILIRUBIN TOTAL   0.3  Comment: For infants and newborns, interpretation of results should be based  on gestational age, weight and in agreement with clinical  observations.    Premature Infant recommended reference ranges:  Up to 24 hours.............<8.0 mg/dL  Up to 48 hours............<12.0 mg/dL  3-5 days..................<15.0 mg/dL  6-29 days.................<15.0 mg/dL         BNP   64  Comment: Values of less than 100 pg/ml are consistent with non-CHF populations.       BUN   11       Calcium   9.3       Chloride   104       CO2   26       Creatinine   0.9       Differential Method   Automated       eGFR   >60       Eos #   0.3       Eos %   2.1       Glucose   130       Gran # (ANC)   7.8       Gran %   61.7       Hematocrit   44.1       Hemoglobin   14.2       Immature Grans (Abs)   0.03  Comment: Mild elevation in immature granulocytes is non specific and   can be seen in a variety of conditions including stress response,   acute inflammation, trauma and pregnancy. Correlation with other   laboratory and clinical findings is essential.         Immature Granulocytes   0.2       Lymph #   3.9       Lymph %   30.9       MCH   28.3       MCHC   32.2       MCV   88       Mono #   0.6       Mono %   4.8       MPV   9.8       nRBC   0       Platelet Count   312       Potassium   4.0       PROTEIN TOTAL    7.7       RBC   5.02       RDW   13.7       Sodium   141       Troponin I 0.010  Comment: The reference interval for Troponin I represents the 99th percentile   cutoff   for our facility and is consistent with 3rd generation assay   performance.     0.010  Comment: The reference interval for Troponin I represents the 99th percentile   cutoff   for our facility and is consistent with 3rd generation assay   performance.         WBC   12.61               Significant Imaging: I have reviewed all pertinent imaging results/findings within the past 24 hours.  I have reviewed and interpreted all pertinent imaging results/findings within the past 24 hours.

## 2024-09-25 NOTE — ASSESSMENT & PLAN NOTE
Patient reports history of kidney stones in the past, CT abdomen and pelvis without contrast without acute abnormality, diverticulosis noted without diverticulitis, no evidence of stone or hydronephrosis.  t, UA.

## 2024-09-25 NOTE — HOSPITAL COURSE
59-year-old female with a past medical history of arthritis, current smoker times 40 pack years presented to the emergency department for the evaluation of chest pain.  Patient stated that she was evaluated and admitted at [facility] on 09/16/2024 for an NSTEMI.  She reported that she was to have an angiogram at that time however she left against medical advice.  Patient was evaluated in the emergency department at [facility], EKG with nonspecific ST changes, no STEMI, troponin without elevation.  Patient had cardiac stress testing which was abnormal, patient also had echocardiogram with ejection fraction 55-60% with no wall motion abnormalities.  Cardiology consulted and recommends cardiac catheterization due to abnormal stress test. Cardiology discussed this with patient at length Patient is refusing inpatient angiogram at this time.  .  Patient will be started on metoprolol 12.5 mg b.i.d., Plavix 75 mg daily, aspirin 81 mg daily, atorvastatin 40 mg daily with smoking cessation per Cardiology recommendations.  Patient will follow up with Cardiology in 1 week.  Advised to return to the emergency department for any chest pain, shortness of breath, fever chills nausea vomiting diarrhea lightheadedness or any other concerning symptoms.

## 2024-09-25 NOTE — PLAN OF CARE
SW sent pt's information to [employer] to set up a PCP appt for pt.     09/25/24 2193   Post-Acute Status   Hospital Resources/Appts/Education Provided Appointments scheduled and added to AVS

## 2024-09-25 NOTE — NURSING
"Patient voice concern in regards of getting a new iv placement. I explained to patient I could I have to make sure I get a new access before placing another IV. Patient proceeds to scream and cuss at me, telling me to " get the f out of her room. Charge nurse notified. Plan of care ongoing.   "
Attempted to call patient due to forgetting one of her prescription meds unable to reach patient at this time. Charge nurse notified     
Nurses Note -- 4 Eyes      9/25/2024   4:16 AM      Skin assessed during: Admit      [x] No Altered Skin Integrity Present    [x]Prevention Measures Documented      [] Yes- Altered Skin Integrity Present or Discovered   [] LDA Added if Not in Epic (Describe Wound)   [] New Altered Skin Integrity was Present on Admit and Documented in LDA   [] Wound Image Taken    Wound Care Consulted? No    Attending Nurse:  Nadeen BEDOLLA RN    Second RN/Staff Member:   Lisa QUIGLEY RN            
Patient back in room. I explained to patient she has to let someone knows when she leaves off floor. Patient begin to record from her phone. Charge nurse and manager notified.   
Patient left off unit without notifying staff.  Charge nurse notified.   
Patient was brought back to room from stress test. Patient has left floor again without notifying staff. Patient have been explained and instructed prior to let staff know before leaving off floor. MD and charge nurse notfied.   
.

## 2024-09-25 NOTE — ASSESSMENT & PLAN NOTE
Patient is 40 pack-year smoking history.  Nicotine patch Q 24 hours  Discussed smoking cessation with the patient

## 2024-09-25 NOTE — H&P
[facility] Medicine  History & Physical    Patient Name: Ceci Fitzpatrick  MRN: [id number]  Patient Class: IP- Inpatient  Admission Date: 9/24/2024  Attending Physician: Angelito Crook MD   Primary Care Provider: Tano Parr MD         Patient information was obtained from patient and ER records.     Subjective:     Principal Problem:<principal problem not specified>    Chief Complaint:   Chief Complaint   Patient presents with    Chest Pain     Heart attack 9/22        HPI: The patient is a 59-year-old female with past medical history of arthritis who presented to [facility] for the evaluation of chest discomfort.    The patient reports that she was admitted to [facility] on 09/16/2024 for NSTEMI.  The patient reports that she was having intermittent chest pain and was found to have elevated high sensitivity troponin.  The patient was arranged for angiogram however the patient signed out AMA before doing angiogram as the patient wanted to get her cardiac care in the area where she lives which is here.  She reports that she has been having intermittent chest pain for the last few days.  She reports that the chest pain was fleeting, without any radiation not associated with any physical activity. She denies any fever, chills, shortness of breath, bilateral swelling of lower extremities, orthopnea, PND, nausea, vomiting. She denies any prior history of any heart issues. She complains of pain over her left flank area.  She denies any urinary symptoms, diarrhea, constipation.  She reports that she smokes cigarettes and has been smoking for the last 40 years.     No past medical history on file.    No past surgical history on file.    Review of patient's allergies indicates:  No Known Allergies    No current facility-administered medications on file prior to encounter.     No current outpatient medications on file prior to encounter.     Family History    None       Tobacco Use    Smoking status: Every  Day     Current packs/day: 1.00     Types: Cigarettes    Smokeless tobacco: Not on file   Substance and Sexual Activity    Alcohol use: Yes     Comment: occas    Drug use: Not on file    Sexual activity: Yes     Birth control/protection: None     Review of Systems    Review of symptoms:    Constitutional:  Negative for fever, chills, generalized weakness, appetite change  HENT:  Negative for congestion, sore throat  Eyes:  Negative for redness, discharge  Respiratory:  Negative for cough and shortness of breath  Cardiovascular:  Positive for chest pain  Gastrointestinal:  Negative for abdominal pain, nausea, vomiting, diarrhea  Genitourinary:  Negative for flank pain, difficulty urination  Musculoskeletal:  Negative for arthralgia, myalgia  Skin:  Negative for color change  Neuro:  Negative for dizziness, focal weakness  Psychiatric:  Negative for agitation, confusion    Objective:     Vital Signs (Most Recent):  Temp: 98 °F (36.7 °C) (09/24/24 2241)  Pulse: 78 (09/24/24 2300)  Resp: 18 (09/24/24 2300)  BP: 115/74 (84) (09/24/24 2241)  SpO2: 96 % (09/24/24 2300) Vital Signs (24h Range):  Temp:  [97.3 °F (36.3 °C)-98 °F (36.7 °C)] 98 °F (36.7 °C)  Pulse:  [] 78  Resp:  [18] 18  SpO2:  [95 %-98 %] 96 %  BP: (105-138)/(51-81) 115/74     Weight: 91.6 kg (202 lb)  Body mass index is 31.64 kg/m².     Physical Exam      Physical examination:    General:  Awake, alert, not in apparent distress  Head:  NC/AT  HEENT:  PERRLA, EOMI, no pallor or icterus               Oral mucosa moist without exudates or erythema  Neck:  Supple, no JVD, no lymphadenopathy  Chest:  S1-S2 normal, no M/G/R  Respiratory:  Normal vesicular breath sounds with no added sounds  Abdomen:  Soft, nondistended, nontender, no organomegaly, bowel sounds present  Extremities:  No pitting edema of bilateral lower extremities present  Neuro:  Awake, alert, oriented x3, grossly intact motor and sensory exam  Psychiatric:  Normal mood and affect      Significant Labs: All pertinent labs within the past 24 hours have been reviewed.  Recent Lab Results         09/24/24  1343   09/24/24  1219        Albumin   3.6       ALP   112       ALT   9       Anion Gap   11       AST   11       Baso #   0.04       Basophil %   0.3       BILIRUBIN TOTAL   0.3  Comment: For infants and newborns, interpretation of results should be based  on gestational age, weight and in agreement with clinical  observations.    Premature Infant recommended reference ranges:  Up to 24 hours.............<8.0 mg/dL  Up to 48 hours............<12.0 mg/dL  3-5 days..................<15.0 mg/dL  6-29 days.................<15.0 mg/dL         BNP   64  Comment: Values of less than 100 pg/ml are consistent with non-CHF populations.       BUN   11       Calcium   9.3       Chloride   104       CO2   26       Creatinine   0.9       Differential Method   Automated       eGFR   >60       Eos #   0.3       Eos %   2.1       Glucose   130       Gran # (ANC)   7.8       Gran %   61.7       Hematocrit   44.1       Hemoglobin   14.2       Immature Grans (Abs)   0.03  Comment: Mild elevation in immature granulocytes is non specific and   can be seen in a variety of conditions including stress response,   acute inflammation, trauma and pregnancy. Correlation with other   laboratory and clinical findings is essential.         Immature Granulocytes   0.2       Lymph #   3.9       Lymph %   30.9       MCH   28.3       MCHC   32.2       MCV   88       Mono #   0.6       Mono %   4.8       MPV   9.8       nRBC   0       Platelet Count   312       Potassium   4.0       PROTEIN TOTAL   7.7       RBC   5.02       RDW   13.7       Sodium   141       Troponin I 0.010  Comment: The reference interval for Troponin I represents the 99th percentile   cutoff   for our facility and is consistent with 3rd generation assay   performance.     0.010  Comment: The reference interval for Troponin I represents the 99th percentile    cutoff   for our facility and is consistent with 3rd generation assay   performance.         WBC   12.61               Significant Imaging: I have reviewed all pertinent imaging results/findings within the past 24 hours.  I have reviewed and interpreted all pertinent imaging results/findings within the past 24 hours.  Assessment/Plan:     # chest pain:  Rule out ACS  Troponin I:  0.010--> 0.010, EKG does not show any acute ST-T wave changes  We will trend troponin q.6 and continue telemetry  Will start Aspirin 81 mg daily, Lipitor 40 mg daily  Follow up TSH, A1c, lipid panel  Cardiology consulted consulted by ED physician, we will keep patient NPO for possible cath in a.m.    # Left flank pain:  Follow-up CT abdomen and pelvis with IV contrast    # current smoker:  Patient refused nicotine patch  Counseled to quit smoking cigarettes  Patient interested in Chantix upon discharge    # GERD: on protonix    # Obesity class 1:  BMI 31.64    # Code: Full code  # Diet:  NPO after midnight  # DVT prophylaxis:  Lovenox 40 mg sq daily               Angelito Crook MD  Department of Hospital Medicine  Formerly [facility], later [facility]

## 2024-09-25 NOTE — PLAN OF CARE
[facility]  Initial Discharge Assessment       Primary Care Provider: Tano Parr MD    Admission Diagnosis: Chest pain [R07.9]    Admission Date: 9/24/2024  Expected Discharge Date: 9/27/2024    Transition of Care Barriers: None    Payor: MEDICAID / Plan: [facility] COMMUNITY PLAN [employer] ([place] MEDICA[place]) / Product Type: Managed Medicaid /     Extended Emergency Contact Information  Primary Emergency Contact: Miguel River  Address: [address] [place] [address] [facility]  Home Phone: [phone]  Mobile Phone: [phone]  Relation: Relative  Secondary Emergency Contact: Reggie Fair  Mobile Phone: [phone]  Relation: Significant other    Discharge Plan A: Home with family  Discharge Plan B: Home    DC assessment completed with patient at bedside. Verified information on facesheet as correct. Denies POA. States legally  to Reggie Fair but do not live together. Lives at listed address with her godfather. Does not have PCP- okay with CM assisting with PCP at DC. Wants to use [facility] at [place]. Denies hh/hd/dme/blood thinners/oupt services/home meds. Independent at baseline. Reports that her spouse or godfather will provide transportation home. She drives herself to [facility]. Recent admit at [facility] (she left AMA). Verified insurance on file. DC plan is home.    Initial Assessment (most recent)       Adult Discharge Assessment - 09/25/24 1327          Discharge Assessment    Assessment Type Discharge Planning Assessment     Confirmed/corrected address, phone number and insurance Yes     Confirmed Demographics Correct on Facesheet     Source of Information patient     Communicated ELVI with patient/caregiver Yes     Reason For Admission chest pain     People in Home other relative(s)     Facility Arrived From: home     Do you expect to return to your current living situation? Yes     Do you have help at home or someone to help you manage your care at home? Yes      Prior to hospitilization cognitive status: Alert/Oriented     Current cognitive status: Alert/Oriented     Walking or Climbing Stairs Difficulty no     Dressing/Bathing Difficulty no     Equipment Currently Used at Home none     Readmission within 30 days? Yes     Patient currently being followed by outpatient case management? No     Do you currently have service(s) that help you manage your care at home? No     Do you take prescription medications? Yes     Do you have prescription coverage? Yes     Do you have any problems affording any of your prescribed medications? No     Is the patient taking medications as prescribed? yes     Who is going to help you get home at discharge? spouse     How do you get to doctors appointments? family or friend will provide     Are you on dialysis? No     Do you take coumadin? No     Discharge Plan A Home with family     Discharge Plan B Home     DME Needed Upon Discharge  none     Discharge Plan discussed with: Patient     Transition of Care Barriers None

## 2024-09-25 NOTE — SUBJECTIVE & OBJECTIVE
Interval History: Patient laying in bed. No     Review of Systems   Constitutional:  Positive for activity change and fatigue. Negative for appetite change, chills and fever.   Respiratory:  Positive for chest tightness and shortness of breath. Negative for cough.    Cardiovascular:  Positive for chest pain. Negative for leg swelling.   Gastrointestinal:  Negative for abdominal distention, abdominal pain, diarrhea, nausea and vomiting.   Genitourinary:  Positive for flank pain. Negative for dysuria and urgency.   Neurological:  Negative for syncope.     Objective:     Vital Signs (Most Recent):  Temp: 98.1 °F (36.7 °C) (09/25/24 0750)  Pulse: 61 (09/25/24 0820)  Resp: 16 (09/25/24 0820)  BP: (!) 151/81 (100) (09/25/24 0750)  SpO2: 96 % (09/25/24 0820) Vital Signs (24h Range):  Temp:  [97.8 °F (36.6 °C)-98.1 °F (36.7 °C)] 98.1 °F (36.7 °C)  Pulse:  [61-94] 61  Resp:  [16-18] 16  SpO2:  [95 %-98 %] 96 %  BP: (105-151)/(51-81) 151/81     Weight: 91.6 kg (202 lb)  Body mass index is 31.64 kg/m².    Intake/Output Summary (Last 24 hours) at 9/25/2024 1505  Last data filed at 9/25/2024 1445  Gross per 24 hour   Intake 0 ml   Output --   Net 0 ml         Physical Exam  Constitutional:       Appearance: Normal appearance.   HENT:      Head: Normocephalic.      Nose: Nose normal.      Mouth/Throat:      Mouth: Mucous membranes are moist.      Pharynx: Oropharynx is clear.      Comments: Poor dentition  Eyes:      Conjunctiva/sclera: Conjunctivae normal.      Pupils: Pupils are equal, round, and reactive to light.   Cardiovascular:      Rate and Rhythm: Normal rate and regular rhythm.      Pulses: Normal pulses.      Heart sounds: Normal heart sounds.   Pulmonary:      Effort: Pulmonary effort is normal.      Breath sounds: Normal breath sounds.   Abdominal:      General: Bowel sounds are normal. There is no distension.      Palpations: Abdomen is soft.      Tenderness: There is no abdominal tenderness. There is no guarding.    Musculoskeletal:         General: Normal range of motion.      Cervical back: Normal range of motion.   Skin:     General: Skin is warm.      Capillary Refill: Capillary refill takes less than 2 seconds.   Neurological:      Mental Status: She is alert and oriented to person, place, and time. Mental status is at baseline.   Psychiatric:      Comments: Anxious             Significant Labs: All pertinent labs within the past 24 hours have been reviewed.  Recent Lab Results         09/25/24  1219   09/25/24  1000   09/25/24  0457   09/25/24  0040        Systolic blood pressure 117             A4C EF   52           Albumin     3.6         ALP     86         ALT     6         Anion Gap     6         Ao root annulus   2.30           Ao peak elvin   1.26           Ao VTI   23.90           AST     9         AV valve area   2.02           DAVID by Velocity Ratio   2.25           AORTIC VALVE CUSP SEPERATION   1.20           AV mean gradient   3           AV index (prosthetic)   0.71           AV peak gradient   6           AV Velocity Ratio   0.79           Baso #     0.04         Basophil %     0.3         BILIRUBIN TOTAL     0.4  Comment: For infants and newborns, interpretation of results should be based  on gestational age, weight and in agreement with clinical  observations.    Premature Infant recommended reference ranges:  Up to 24 hours.............<8.0 mg/dL  Up to 48 hours............<12.0 mg/dL  3-5 days..................<15.0 mg/dL  6-29 days.................<15.0 mg/dL           BSA   2.08           BUN     9         Calcium     8.7         Chloride     107         Cholesterol Total       150  Comment: The National Cholesterol Education Program (NCEP) has set the  following guidelines (reference ranges) for Cholesterol:  Optimal.....................<200 mg/dL  Borderline High.............200-239 mg/dL  High........................> or = 240 mg/dL         CO2     27         Creatinine     0.7         Left Ventricle  Relative Wall Thickness   0.39           dose 0.4             HR at rest 75             Diastolic blood pressure 65             Differential Method     Automated         E/A ratio   0.76           E/E' ratio   7.48           eGFR     >60.0         Eos #     0.3         Eos %     2.7         Estimated Avg Glucose       128       E wave deceleration time   157.00           Free T4       0.80       FS   35           Glucose     126         Gran # (ANC)     7.3         Gran %     56.7         HDL       39  Comment: The National Cholesterol Education Program (NCEP) has set the  following guidelines (reference values) for HDL Cholesterol:  Low...............<40 mg/dL  Optimal...........>60 mg/dL         HDL/Cholesterol Ratio       26.0       Hematocrit     40.3         Hemoglobin     13.1         Hemoglobin A1C External       6.1  Comment: According to ADA guidelines, hemoglobin A1C <7.0% represents  optimal control in non-pregnant diabetic patients.  Different  metrics may apply to specific populations.   Standards of Medical Care in Diabetes - 2016.    For the purpose of screening for the presence of diabetes:  <5.7%     Consistent with the absence of diabetes  5.7-6.4%  Consistent with increasing risk for diabetes   (prediabetes)  >or=6.5%  Consistent with diabetes    Currently no consensus exists for use of hemoglobin A1C  for diagnosis of diabetes for children.         Immature Grans (Abs)     0.04  Comment: Mild elevation in immature granulocytes is non specific and   can be seen in a variety of conditions including stress response,   acute inflammation, trauma and pregnancy. Correlation with other   laboratory and clinical findings is essential.           Immature Granulocytes     0.3         IVC diameter   1.20           IVSd   0.90           LA area A2C   17.30           LA area A4C   11.70           LA volume   35.50           LA Volume Index (Mod)   17.5           LVOT area   2.8           LDL Cholesterol        86.2  Comment: The National Cholesterol Education Program (NCEP) has set the  following guidelines (reference values) for LDL Cholesterol:  Optimal.......................<130 mg/dL  Borderline High...............130-159 mg/dL  High..........................160-189 mg/dL  Very High.....................>190 mg/dL         LV LATERAL E/E' RATIO   6.14           LV SEPTAL E/E' RATIO   9.56           LV EDV BP   97.34           LV Diastolic Volume Index   47.95           Left Ventricular End Diastolic Volume by Teichholz Method   97.34           LV EDV A4C   72.00           Left Ventricular End Systolic Volume by Teichholz Method   35.00           LV ESV A2C   47.60           LV ESV A4C   24.30           LVIDd   4.60           LVIDs   3.00           LV mass   137.72           LV Mass Index   68           Left Ventricular Outflow Tract Mean Gradient   2.00           Left Ventricular Outflow Tract Mean Velocity   0.64           LVOT diameter   1.90           LVOT peak fercho   1.00           LVOT stroke volume   48.18           LVOT peak VTI   17.00           LV ESV BP   35.00           LV Systolic Volume Index   17.2           Lymph #     4.4         Lymph %     34.0         Magnesium      1.8         MCH     28.1         MCHC     32.5         MCV     87         Mean e'   0.12           Mono #     0.8         Mono %     6.0         MPV     9.8         MV valve area p 1/2 method   3.33           MV valve area by continuity eq   1.87           MV mean gradient   3           MV peak gradient   5           MV Peak A Fercho   1.13           MV Peak E Fercho   0.86           MV stenosis pressure 1/2 time   66.00           MV VTI   25.7           Non-HDL Cholesterol       111  Comment: Risk category and Non-HDL cholesterol goals:  Coronary heart disease (CHD)or equivalent (10-year risk of CHD >20%):  Non-HDL cholesterol goal     <130 mg/dL  Two or more CHD risk factors and 10-year risk of CHD <= 20%:  Non-HDL cholesterol goal     <160  mg/dL  0 to 1 CHD risk factor:  Non-HDL cholesterol goal     <190 mg/dL         nRBC     0         1 Minute Recovery              85% Max Predicted              Max Predicted              OHS CV CPX PATIENT IS FEMALE 1.0             OHS CV CPX PATIENT IS MALE 0.0             Peak Diatolic BP 53             Peak .0             Peak RPP 13,680             Peak Systolic              % Max HR Achieved 73             RPP 8,775             Platelet Count     266         Potassium     3.9         PROTEIN TOTAL     6.5         Pulmonary Valve Mean Velocity   0.66           PV peak gradient   4           PV PEAK VELOCITY   0.98           Posterior Wall   0.90           Est. RA pres   3           RA area length vol   21.90           RBC     4.66         RDW     13.8         RV S'   10.70           Sodium     140         TAPSE   2.04           TDI SEPTAL   0.09           TDI LATERAL   0.14           Total Cholesterol/HDL Ratio       3.8       Triglycerides       124  Comment: The National Cholesterol Education Program (NCEP) has set the  following guidelines (reference values) for triglycerides:  Normal......................<150 mg/dL  Borderline High.............150-199 mg/dL  High........................200-499 mg/dL         Troponin I High Sensitivity     5.5  Comment: Troponin results differ between methods. Do not use   results between Troponin methods interchangeably.    Alkaline Phospatase levels above 400 U/L may   cause false positive results.    Access hsTnI should not be used for patients taking   Asfotase rosa (Strensiq).     6.7  Comment: Troponin results differ between methods. Do not use   results between Troponin methods interchangeably.    Alkaline Phospatase levels above 400 U/L may   cause false positive results.    Access hsTnI should not be used for patients taking   Asfotase rosa (Strensiq).         TSH       2.255       WBC     12.81         ZLVIDD   -2.68           ZLVIDS    -1.63                   Significant Imaging: I have reviewed all pertinent imaging results/findings within the past 24 hours.

## 2024-09-25 NOTE — ASSESSMENT & PLAN NOTE
acute, trend troponin, ASA, telemetry, consult cardiology for further risk stratification with non-invasive stress test.     Echocardiogram with EF 55-60%, no wall motion abnormalities  Stress test abnormal  Cardiology consulted  Cardiology recommended cardiac catheterization however patient declining at this time per Cardiology  We will treat medically with metoprolol 12.5 mg b.i.d., aspirin 81 mg, Plavix 75 mg, atorvastatin 40 mg

## 2024-09-26 LAB
OHS QRS DURATION: 80 MS
OHS QTC CALCULATION: 467 MS

## 2024-09-26 NOTE — DISCHARGE SUMMARY
[facility]  Discharge Summary      Patient Name: Ceci Fitzpatrick  MRN: [id number]  LAMBERTO: [id number]  Patient Class: IP- Inpatient  Admission Date: 9/24/2024  Hospital Length of Stay: 2 days  Discharge Date and Time:  09/26/2024 7:32 AM  Attending Physician: Liberty Cisneros MD   Discharging Provider: SHAHZAD Higuera  Primary Care Provider: Tano Parr MD    Primary Care Team: Networked reference to record PCT     HPI:   The patient is a 59-year-old female with past medical history of arthritis who presented to [facility] for the evaluation of chest discomfort.    The patient reports that she was admitted to [facility] on 09/16/2024 for NSTEMI.  The patient reports that she was having intermittent chest pain and was found to have elevated high sensitivity troponin.  The patient was arranged for angiogram however the patient signed out AMA before doing angiogram as the patient wanted to get her cardiac care in the area where she lives which is here.  She reports that she has been having intermittent chest pain for the last few days.  She reports that the chest pain was fleeting, without any radiation not associated with any physical activity. She denies any fever, chills, shortness of breath, bilateral swelling of lower extremities, orthopnea, PND, nausea, vomiting. She denies any prior history of any heart issues. She complains of pain over her left flank area.  She denies any urinary symptoms, diarrhea, constipation.  She reports that she smokes cigarettes and has been smoking for the last 40 years.     * No surgery found *      Hospital Course:   59-year-old female with a past medical history of arthritis, current smoker times 40 pack years presented to the emergency department for the evaluation of chest pain.  Patient stated that she was evaluated and admitted at [facility] on 09/16/2024 for an NSTEMI.  She reported that she was to have an angiogram at that time  however she left against medical advice.  Patient was evaluated in the emergency department at [facility], EKG with nonspecific ST changes, no STEMI, troponin without elevation.  Patient had cardiac stress testing which was abnormal, patient also had echocardiogram with ejection fraction 55-60% with no wall motion abnormalities.  Cardiology consulted and recommends cardiac catheterization due to abnormal stress test. Cardiology discussed this with patient at length Patient is refusing inpatient angiogram at this time.  .  Patient will be started on metoprolol 12.5 mg b.i.d., Plavix 75 mg daily, aspirin 81 mg daily, atorvastatin 40 mg daily with smoking cessation per Cardiology recommendations.  Patient will follow up with Cardiology in 1 week.  Advised to return to the emergency department for any chest pain, shortness of breath, fever chills nausea vomiting diarrhea lightheadedness or any other concerning symptoms.     Goals of Care Treatment Preferences:  Code Status: Full Code         Consults:   Consults (From admission, onward)          Status Ordering Provider     Inpatient consult to Cardiology  Once        Provider:  Frederick England MD    Completed NICOLASA MCCALL            Cardiac/Vascular  * Chest pain  acute, trend troponin, ASA, telemetry, consult cardiology for further risk stratification with non-invasive stress test.     Echocardiogram with EF 55-60%, no wall motion abnormalities  Stress test abnormal  Cardiology consulted  Cardiology recommended cardiac catheterization however patient declining at this time per Cardiology  We will treat medically with metoprolol 12.5 mg b.i.d., aspirin 81 mg, Plavix 75 mg, atorvastatin 40 mg    GI  Left flank pain    Patient reports history of kidney stones in the past, CT abdomen and pelvis without contrast without acute abnormality, diverticulosis noted without diverticulitis, no evidence of stone or hydronephrosis.  t, UA.    Other  Current every day  smoker    Patient is 40 pack-year smoking history.  Nicotine patch Q 24 hours  Discussed smoking cessation with the patient      Final Active Diagnoses:    Diagnosis Date Noted POA    PRINCIPAL PROBLEM:  Chest pain [R07.9] 09/25/2024 Unknown    Left flank pain [R10.9] 09/25/2024 Yes    Current every day smoker [F17.200] 09/25/2024 Yes      Problems Resolved During this Admission:       Discharged Condition: good    Disposition: Home or Self Care    Follow Up:    Patient Instructions:      Ambulatory referral/consult to Cardiology   Standing Status: Future   Referral Priority: Routine Referral Type: Consultation   Referral Reason: Specialty Services Required   Requested Specialty: Cardiology   Number of Visits Requested: 1     Ambulatory referral/consult to Smoking Cessation Program   Standing Status: Future   Referral Priority: Routine Referral Type: Consultation   Referral Reason: Specialty Services Required   Requested Specialty: CTTS   Number of Visits Requested: 1     Diet Cardiac     Notify your health care provider if you experience any of the following:  temperature >100.4     Notify your health care provider if you experience any of the following:  persistent nausea and vomiting or diarrhea     Notify your health care provider if you experience any of the following:  severe uncontrolled pain     Notify your health care provider if you experience any of the following:  redness, tenderness, or signs of infection (pain, swelling, redness, odor or green/yellow discharge around incision site)     Notify your health care provider if you experience any of the following:  difficulty breathing or increased cough     Notify your health care provider if you experience any of the following:  severe persistent headache     Notify your health care provider if you experience any of the following:  persistent dizziness, light-headedness, or visual disturbances     Notify your health care provider if you experience any of  the following:  worsening rash     Notify your health care provider if you experience any of the following:  increased confusion or weakness     Notify your health care provider if you experience any of the following:     Activity as tolerated       Significant Diagnostic Studies: Labs: All labs within the past 24 hours have been reviewed    Pending Diagnostic Studies:       Procedure Component Value Units Date/Time    CBC with Automated Differential [[id number]]     Order Status: Sent Lab Status: No result     Specimen: Blood     Comprehensive Metabolic Panel (CMP) [[id number]]     Order Status: Sent Lab Status: No result     Specimen: Blood     Echo [[id number]]     Order Status: Sent Lab Status: No result     Magnesium [[id number]]     Order Status: Sent Lab Status: No result     Specimen: Blood            Medications:  Reconciled Home Medications:      Medication List        START taking these medications      aspirin 81 MG EC tablet  Commonly known as: ECOTRIN  Take 1 tablet (81 mg total) by mouth once daily.     atorvastatin 40 MG tablet  Commonly known as: LIPITOR  Take 1 tablet (40 mg total) by mouth once daily.     cefUROXime 500 MG tablet  Commonly known as: CEFTIN  Take 1 tablet (500 mg total) by mouth every 12 (twelve) hours. for 5 days     clopidogreL 75 mg tablet  Commonly known as: PLAVIX  Take 1 tablet (75 mg total) by mouth once daily.     metoprolol tartrate 25 MG tablet  Commonly known as: LOPRESSOR  Take 0.5 tablets (12.5 mg total) by mouth 2 (two) times daily.     nicotine 21 mg/24 hr  Commonly known as: NICODERM CQ  Place 1 patch onto the skin once daily.              Indwelling Lines/Drains at time of discharge:   Lines/Drains/Airways       None                   Time spent on the discharge of patient: 35 minutes         SHAHZAD Higuera  Department of Hospital Medicine  [facility]